# Patient Record
(demographics unavailable — no encounter records)

---

## 2025-02-13 NOTE — END OF VISIT
[FreeTextEntry3] : A basic metabolic panel and hemoglobin A1c are sent.  I reviewed the situation with the patient and prescribed clotrimazole betamethasone ointment to be applied 3 times daily while he is symptomatic.  He was told to lavage the head of his penis and dry prior to applying the ointment

## 2025-02-13 NOTE — PHYSICAL EXAM
[Normal Appearance] : normal appearance [Well Groomed] : well groomed [General Appearance - In No Acute Distress] : no acute distress [Edema] : no peripheral edema [Respiration, Rhythm And Depth] : normal respiratory rhythm and effort [Exaggerated Use Of Accessory Muscles For Inspiration] : no accessory muscle use [Abdomen Soft] : soft [Abdomen Tenderness] : non-tender [Costovertebral Angle Tenderness] : no ~M costovertebral angle tenderness [Urinary Bladder Findings] : the bladder was normal on palpation [Normal Station and Gait] : the gait and station were normal for the patient's age [] : no rash [No Focal Deficits] : no focal deficits [Oriented To Time, Place, And Person] : oriented to person, place, and time [Affect] : the affect was normal [Mood] : the mood was normal [No Palpable Adenopathy] : no palpable adenopathy [de-identified] : Mild balanitis is noted

## 2025-02-13 NOTE — REVIEW OF SYSTEMS
[see HPI] : see HPI [Painful Zortman] : painful Zortman [Genital bacterial infection] : genital bacterial infection [Negative] : Genitourinary

## 2025-02-13 NOTE — HISTORY OF PRESENT ILLNESS
[FreeTextEntry1] : This patient presents today for the evaluation of balanitis.  He states he has no urethral discharge or other difficulties.  He states that this is a problem that is relatively recurrent.  On questioning he states that he has had a history of a high blood sugar in the past.

## 2025-05-27 NOTE — HISTORY OF PRESENT ILLNESS
[FreeTextEntry1] : 31y/o male here today for occasional irritation of the glans. The patient states that he has been having this issue for 2 months.  Denies nocturia, hematuria, dysuria, flank pain, or any other urinary issues LILIA Denies eating spicy, citrus, chocolate. Adequately hydrating   FHx of PCA: negative Tobacco use: negative Last PSA: N/A Last creatinine: 0.90mg/dL (02/13/2025) Last UCx: N/A Uro meds: None

## 2025-05-27 NOTE — HISTORY OF PRESENT ILLNESS
[FreeTextEntry1] : 29y/o male here today for occasional irritation of the glans. The patient states that he has been having this issue for 2 months.  Denies nocturia, hematuria, dysuria, flank pain, or any other urinary issues LILIA Denies eating spicy, citrus, chocolate. Adequately hydrating   FHx of PCA: negative Tobacco use: negative Last PSA: N/A Last creatinine: 0.90mg/dL (02/13/2025) Last UCx: N/A Uro meds: None

## 2025-05-27 NOTE — ASSESSMENT
[FreeTextEntry1] : 31y/o male here today for occasional irritation of the glans. The patient states that he has been having this issue for 2 months.  Denies nocturia, hematuria, dysuria, flank pain, or any other urinary issues LILIA Denies eating spicy, citrus, chocolate. Adequately hydrating   FHx of PCA: negative Tobacco use: negative Last PSA: N/A Last creatinine: 0.90mg/dL (02/13/2025) Last UCx: N/A Uro meds: None   On physical check, the glans base and the foreskin are slightly irritated Collecting urine for UA and Culture  Suggesting use of palmitoylethanolamide cream, topical.

## 2025-05-27 NOTE — ASSESSMENT
[FreeTextEntry1] : 29y/o male here today for occasional irritation of the glans. The patient states that he has been having this issue for 2 months.  Denies nocturia, hematuria, dysuria, flank pain, or any other urinary issues LILIA Denies eating spicy, citrus, chocolate. Adequately hydrating   FHx of PCA: negative Tobacco use: negative Last PSA: N/A Last creatinine: 0.90mg/dL (02/13/2025) Last UCx: N/A Uro meds: None   On physical check, the glans base and the foreskin are slightly irritated Collecting urine for UA and Culture  Suggesting use of palmitoylethanolamide cream, topical.